# Patient Record
Sex: MALE | Race: WHITE | NOT HISPANIC OR LATINO | URBAN - METROPOLITAN AREA
[De-identification: names, ages, dates, MRNs, and addresses within clinical notes are randomized per-mention and may not be internally consistent; named-entity substitution may affect disease eponyms.]

---
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
## 2025-02-25 ENCOUNTER — OFFICE VISIT (OUTPATIENT)
Dept: INTERNAL MEDICINE | Facility: CLINIC | Age: 20
End: 2025-02-25
Payer: COMMERCIAL

## 2025-02-25 VITALS
DIASTOLIC BLOOD PRESSURE: 72 MMHG | TEMPERATURE: 97.9 F | BODY MASS INDEX: 22.37 KG/M2 | SYSTOLIC BLOOD PRESSURE: 116 MMHG | WEIGHT: 159.8 LBS | HEART RATE: 59 BPM | OXYGEN SATURATION: 100 % | RESPIRATION RATE: 16 BRPM | HEIGHT: 71 IN

## 2025-02-25 DIAGNOSIS — R07.89 ATYPICAL CHEST PAIN: ICD-10-CM

## 2025-02-25 DIAGNOSIS — Z00.00 VISIT FOR WELL MAN HEALTH CHECK: Primary | ICD-10-CM

## 2025-02-25 DIAGNOSIS — Z00.00 HEALTHCARE MAINTENANCE: ICD-10-CM

## 2025-02-25 PROCEDURE — 99214 OFFICE O/P EST MOD 30 MIN: CPT | Performed by: FAMILY MEDICINE

## 2025-02-25 PROCEDURE — 99395 PREV VISIT EST AGE 18-39: CPT | Performed by: FAMILY MEDICINE

## 2025-02-25 NOTE — PROGRESS NOTES
Chief Complaint  Chest Pain (PT has been having chest pain has had normal EKG but chest pain continues daily at 5 pain scale pt states nothing relieves pain )    Subjective          Jorge Quintana presents to [facility]  History of Present Illness  The patient is a 19-year-old male who is coming in as a new patient today.    He has been experiencing persistent chest pain for several years, with no significant change in its intensity or pattern. The pain is described as sharp, predominantly localized in the middle of his left side, but occasionally radiating throughout his chest. He has sought medical attention on multiple occasions, receiving varying diagnoses. The onset of the pain is sudden and severe, often confining him to bed. It is not associated with shortness of breath but is exacerbated by chest extension or movement. The pain episodes typically last for approximately 10 hours before gradually subsiding, allowing him to seek medical care. He does not have pain currently, but he has been experiencing daily chest discomfort for the past 3 months, a new development in his symptomatology. These daily episodes are less intense than the previous ones but still cause significant discomfort. He has consulted with several physicians at [facility], one of whom suggested a potential pulmonary etiology for his symptoms. He has not yet consulted a cardiologist. His last medical consultation was some time ago, and he is open to undergoing a physical examination and blood tests during this visit. He has had three severe episodes that necessitated hospital visits. During these episodes, he experiences difficulty in using his body, particularly when attempting to work out, as it induces a shocking sensation in his chest. This limitation persists for 1 to 2 weeks post-episode before resolving.    He has a history of smoking, which he reports exacerbates his chest pain. Despite abstaining  "from smoking for the past 5 months, he continues to experience these symptoms.    SOCIAL HISTORY  The patient used to smoke but has not smoked in approximately 5 months. He is a sophomore at [employer], studying marketing.    Objective   Vital Signs:   /72 (BP Location: Left arm, Patient Position: Sitting)   Pulse 59   Temp 97.9 °F (36.6 °C) (Oral)   Resp 16   Ht 180.3 cm (70.98\")   Wt 72.5 kg (159 lb 12.8 oz)   SpO2 100%   BMI 22.30 kg/m²     Physical Exam  Vitals and nursing note reviewed.   Constitutional:       Appearance: He is well-developed.   HENT:      Head: Normocephalic and atraumatic.   Musculoskeletal:      Cervical back: Normal range of motion and neck supple.   Neurological:      Mental Status: He is alert and oriented to person, place, and time.   Psychiatric:         Behavior: Behavior normal.         Physical Exam  Oral exam was performed.  Lungs were auscultated.     Result Review :                 Assessment and Plan    Diagnoses and all orders for this visit:    1. Visit for well man health check (Primary)    2. Healthcare maintenance  -     CBC & Differential  -     Comprehensive Metabolic Panel  -     Hemoglobin A1c  -     Thyroid Panel With TSH  -     Lipid Panel With LDL / HDL Ratio  -     Vitamin D,25-Hydroxy  -     Hepatitis C Antibody    3. Atypical chest pain  -     Ambulatory Referral to Pulmonology  -     Ambulatory Referral to Cardiology      Assessment & Plan  1. Chest pain.  He reports experiencing sharp chest pains for a few years, with three severe incidents requiring hospital visits. The pain is described as sharp, located in the middle of the left side, and sometimes all over the chest. It lasts for about 10 hours and eventually fades out. Recently, he has been experiencing daily, subtle chest pain. He has not seen a cardiologist yet. A referral to a pulmonologist will be made for further evaluation. Additionally, a referral to a cardiologist will be arranged to rule out " any cardiac issues. A comprehensive physical examination and blood work will be conducted today.    2. History of smoking.  He used to smoke and noticed that smoking exacerbated his chest pain. He has not smoked for approximately 5 months.    Follow Up   No follow-ups on file.  Patient was given instructions and counseling regarding his condition or for health maintenance advice. Please see specific information pulled into the AVS if appropriate.           Patient or patient representative verbalized consent for the use of Ambient Listening during the visit with  Luis Norton MD for chart documentation. 2/25/2025  12:36 EST

## 2025-02-25 NOTE — PROGRESS NOTES
Subjective   Jorge Quintana is a 19 y.o. male and is here for a comprehensive physical exam. The patient reports no problems.            Social History:   Social History     Socioeconomic History    Marital status: Single   Tobacco Use    Smoking status: Never    Smokeless tobacco: Never   Vaping Use    Vaping status: Never Used   Substance and Sexual Activity    Alcohol use: Yes     Comment: social    Drug use: Yes     Types: Marijuana    Sexual activity: Yes     Partners: Female     Birth control/protection: None       Family History: No family history on file.    Past Medical History: No past medical history on file.    The following portions of the patient's history were reviewed and updated as appropriate: allergies, current medications, past family history, past medical history, past social history, past surgical history and problem list.    Review of Systems    Review of Systems   Constitutional:  Negative for chills and fever.   HENT:  Negative for congestion, rhinorrhea, sinus pain and sore throat.    Eyes:  Negative for photophobia and visual disturbance.   Respiratory:  Negative for cough, chest tightness and shortness of breath.    Cardiovascular:  Negative for chest pain and palpitations.   Gastrointestinal:  Negative for diarrhea, nausea and vomiting.   Genitourinary:  Negative for dysuria, frequency and urgency.   Skin:  Negative for rash and wound.   Neurological:  Negative for dizziness and syncope.   Psychiatric/Behavioral:  Negative for behavioral problems and confusion.        Objective   Physical Exam  Vitals and nursing note reviewed.   Constitutional:       Appearance: He is well-developed.   HENT:      Head: Normocephalic and atraumatic.      Right Ear: External ear normal.      Left Ear: External ear normal.   Cardiovascular:      Rate and Rhythm: Normal rate and regular rhythm.      Heart sounds: Normal heart sounds.   Pulmonary:      Effort: Pulmonary effort is normal. No respiratory  distress.      Breath sounds: Normal breath sounds.   Abdominal:      Palpations: Abdomen is soft.      Tenderness: There is no abdominal tenderness. There is no guarding.   Musculoskeletal:         General: Normal range of motion.      Cervical back: Normal range of motion and neck supple.   Lymphadenopathy:      Cervical: No cervical adenopathy.   Skin:     General: Skin is warm.   Neurological:      Mental Status: He is alert and oriented to person, place, and time.   Psychiatric:         Behavior: Behavior normal.         Vitals:    02/25/25 0840   BP: 116/72   Pulse: 59   Resp: 16   Temp: 97.9 °F (36.6 °C)   SpO2: 100%     Body mass index is 22.3 kg/m².    Medications:   Current Outpatient Medications:     naproxen (NAPROSYN) 500 MG tablet, Take 1 tablet by mouth 2 (Two) Times a Day As Needed for Mild Pain., Disp: 20 tablet, Rfl: 0    tretinoin (ALTRALIN) 0.05 % gel, Apply  topically to the appropriate area as directed Daily., Disp: , Rfl:     tretinoin (RETIN-A) 0.05 % cream, APPLY PEA SIZED AMOUNT TOPICALLY TO FACE AT BEDTIME, Disp: , Rfl:        Assessment & Plan   Healthy male exam.      1. Healthcare Maintenance:  2. Patient Counseling:  --Nutrition: Stressed importance of moderation in sodium/caffeine intake, saturated fat and cholesterol, caloric balance, sufficient intake of fresh fruits, vegetables, fiber, calcium and vit D  --Exercise: Recommended 30 minutes of exercise daily.   --Immunizations reviewed.  --Discussed benefits of screening colonoscopy.    Diagnoses and all orders for this visit:    Visit for well [place] [facility] check    Healthcare maintenance  -     CBC & Differential  -     Comprehensive Metabolic Panel  -     Hemoglobin A1c  -     Thyroid Panel With TSH  -     Lipid Panel With LDL / HDL Ratio  -     Vitamin D,25-Hydroxy  -     Hepatitis C Antibody    Atypical chest pain  -     Ambulatory Referral to Pulmonology  -     Ambulatory Referral to Cardiology        No follow-ups on file.            Dictated utilizing Dragon Voice Recognition Software

## 2025-02-26 LAB
25(OH)D3+25(OH)D2 SERPL-MCNC: 29.1 NG/ML (ref 30–100)
ALBUMIN SERPL-MCNC: 4.4 G/DL (ref 4.3–5.2)
ALP SERPL-CCNC: 106 IU/L (ref 51–125)
ALT SERPL-CCNC: 19 IU/L (ref 0–44)
AST SERPL-CCNC: 20 IU/L (ref 0–40)
BASOPHILS # BLD AUTO: 0 X10E3/UL (ref 0–0.2)
BASOPHILS NFR BLD AUTO: 1 %
BILIRUB SERPL-MCNC: 0.8 MG/DL (ref 0–1.2)
BUN SERPL-MCNC: 16 MG/DL (ref 6–20)
BUN/CREAT SERPL: 16 (ref 9–20)
CALCIUM SERPL-MCNC: 10 MG/DL (ref 8.7–10.2)
CHLORIDE SERPL-SCNC: 105 MMOL/L (ref 96–106)
CHOLEST SERPL-MCNC: 174 MG/DL (ref 100–169)
CO2 SERPL-SCNC: 24 MMOL/L (ref 20–29)
CREAT SERPL-MCNC: 0.98 MG/DL (ref 0.76–1.27)
EGFRCR SERPLBLD CKD-EPI 2021: 114 ML/MIN/1.73
EOSINOPHIL # BLD AUTO: 0.2 X10E3/UL (ref 0–0.4)
EOSINOPHIL NFR BLD AUTO: 4 %
ERYTHROCYTE [DISTWIDTH] IN BLOOD BY AUTOMATED COUNT: 13 % (ref 11.6–15.4)
FT4I SERPL CALC-MCNC: 1.6 (ref 1.2–4.9)
GLOBULIN SER CALC-MCNC: 2.7 G/DL (ref 1.5–4.5)
GLUCOSE SERPL-MCNC: 76 MG/DL (ref 70–99)
HBA1C MFR BLD: 5.2 % (ref 4.8–5.6)
HCT VFR BLD AUTO: 45.5 % (ref 37.5–51)
HCV IGG SERPL QL IA: NON REACTIVE
HDLC SERPL-MCNC: 67 MG/DL
HGB BLD-MCNC: 15 G/DL (ref 13–17.7)
IMM GRANULOCYTES # BLD AUTO: 0 X10E3/UL (ref 0–0.1)
IMM GRANULOCYTES NFR BLD AUTO: 0 %
LDLC SERPL CALC-MCNC: 85 MG/DL (ref 0–109)
LDLC/HDLC SERPL: 1.3 RATIO (ref 0–3.6)
LYMPHOCYTES # BLD AUTO: 1.7 X10E3/UL (ref 0.7–3.1)
LYMPHOCYTES NFR BLD AUTO: 35 %
MCH RBC QN AUTO: 28.5 PG (ref 26.6–33)
MCHC RBC AUTO-ENTMCNC: 33 G/DL (ref 31.5–35.7)
MCV RBC AUTO: 86 FL (ref 79–97)
MONOCYTES # BLD AUTO: 0.5 X10E3/UL (ref 0.1–0.9)
MONOCYTES NFR BLD AUTO: 11 %
NEUTROPHILS # BLD AUTO: 2.4 X10E3/UL (ref 1.4–7)
NEUTROPHILS NFR BLD AUTO: 49 %
PLATELET # BLD AUTO: 190 X10E3/UL (ref 150–450)
POTASSIUM SERPL-SCNC: 3.8 MMOL/L (ref 3.5–5.2)
PROT SERPL-MCNC: 7.1 G/DL (ref 6–8.5)
RBC # BLD AUTO: 5.27 X10E6/UL (ref 4.14–5.8)
SODIUM SERPL-SCNC: 142 MMOL/L (ref 134–144)
T3RU NFR SERPL: 28 % (ref 24–39)
T4 SERPL-MCNC: 5.6 UG/DL (ref 4.5–12)
TRIGL SERPL-MCNC: 124 MG/DL (ref 0–89)
TSH SERPL DL<=0.005 MIU/L-ACNC: 2.46 UIU/ML (ref 0.45–4.5)
VLDLC SERPL CALC-MCNC: 22 MG/DL (ref 5–40)
WBC # BLD AUTO: 4.9 X10E3/UL (ref 3.4–10.8)

## 2025-02-27 ENCOUNTER — PATIENT ROUNDING (BHMG ONLY) (OUTPATIENT)
Dept: INTERNAL MEDICINE | Facility: CLINIC | Age: 20
End: 2025-02-27
Payer: COMMERCIAL

## 2025-02-27 NOTE — PROGRESS NOTES
A my Chart message has been sent to the patient for Patient Rounding with [facility].

## 2025-03-06 ENCOUNTER — NURSE TRIAGE (OUTPATIENT)
Dept: CALL CENTER | Facility: HOSPITAL | Age: 20
End: 2025-03-06
Payer: COMMERCIAL

## 2025-03-06 NOTE — TELEPHONE ENCOUNTER
"HUB- He started having testicle pain 3 days ago. It is still hurting it is on and off. It does not hurt in urinate. Needs to go to ER to be seen. Explained this could be a medical emergency needs to be seen today.    Reason for Disposition   SEVERE pain (e.g., excruciating)    Additional Information   Negative: [1] Rash or color change of scrotum BUT [2] no swelling or pain   Negative: Inguinal hernia previously diagnosed by a doctor (or NP/PA)   Negative: Followed a genital area injury (e.g., penis, scrotum)   Negative: Followed a groin injury or strain   Negative: Swelling of scrotum is main symptom    Answer Assessment - Initial Assessment Questions  1. LOCATION and RADIATION: \"Where is the pain located?\"       Testicle   2. QUALITY: \"What does the pain feel like?\"  (e.g., sharp, dull, aching, burning)      Sharp   3. SEVERITY: \"How bad is the pain?\"  (Scale 1-10; or mild, moderate, severe)    - MILD (1-3): doesn't interfere with normal activities     - MODERATE (4-7): interferes with normal activities (e.g., work or school) or awakens from sleep    - SEVERE (8-10): excruciating pain, unable to do any normal activities, difficulty walking     Comes and goes   4. ONSET: \"When did the pain start?\"      3 days ago  5. PATTERN: \"Does it come and go, or has it been constant since it started?\"      Comes and goes.   6. SCROTAL APPEARANCE: \"What does the scrotum look like?\" \"Is there any swelling or redness?\"       Pain   7. HERNIA: \"Has a doctor ever told you that you have a hernia?\"      no  8. OTHER SYMPTOMS: \"Do you have any other symptoms?\" (e.g., abdomen pain, difficulty passing urine, fever, vomiting)      no    Protocols used: Scrotum Pain-ADULT-AH    "